# Patient Record
Sex: MALE | Race: WHITE | NOT HISPANIC OR LATINO | Employment: FULL TIME | URBAN - METROPOLITAN AREA
[De-identification: names, ages, dates, MRNs, and addresses within clinical notes are randomized per-mention and may not be internally consistent; named-entity substitution may affect disease eponyms.]

---

## 2020-10-05 ENCOUNTER — OFFICE VISIT (OUTPATIENT)
Dept: URGENT CARE | Facility: CLINIC | Age: 55
End: 2020-10-05
Payer: COMMERCIAL

## 2020-10-05 VITALS
SYSTOLIC BLOOD PRESSURE: 133 MMHG | HEART RATE: 84 BPM | OXYGEN SATURATION: 98 % | TEMPERATURE: 98 F | DIASTOLIC BLOOD PRESSURE: 85 MMHG

## 2020-10-05 DIAGNOSIS — J02.9 SORE THROAT: Primary | ICD-10-CM

## 2020-10-05 DIAGNOSIS — Z11.59 SCREENING FOR VIRAL DISEASE: ICD-10-CM

## 2020-10-05 LAB
CTP QC/QA: YES
SARS-COV-2 RDRP RESP QL NAA+PROBE: NEGATIVE

## 2020-10-05 PROCEDURE — U0002: ICD-10-PCS | Mod: QW,S$GLB,, | Performed by: NURSE PRACTITIONER

## 2020-10-05 PROCEDURE — U0002 COVID-19 LAB TEST NON-CDC: HCPCS | Mod: QW,S$GLB,, | Performed by: NURSE PRACTITIONER

## 2020-10-05 PROCEDURE — 99213 OFFICE O/P EST LOW 20 MIN: CPT | Mod: S$GLB,,, | Performed by: NURSE PRACTITIONER

## 2020-10-05 PROCEDURE — 99213 PR OFFICE/OUTPT VISIT, EST, LEVL III, 20-29 MIN: ICD-10-PCS | Mod: S$GLB,,, | Performed by: NURSE PRACTITIONER

## 2020-10-05 NOTE — PATIENT INSTRUCTIONS
Return to Urgent Care or go to ER if symptoms worsen or fail to improve.  Follow up with PCP as recommended for further management.     THE FOLLOWING ARE RECOMMENDED TO HELP YOU MANAGE YOUR SYMPTOMS:    Take Advil allergy and sinus (behind pharmacy counter) every 4-6 hours as needed for congestion, cough and body aches.  This medication contains ibuprofen, an antihistamine--chlorpheniramine , and sudafed, a decongestant.     Take dextromethorphan according to label instructions for cough.     Take Ibuprofen or Acetaminophen according to label instructions for fever, throat pain, headache, and body aches    Use warm salt water gargles to ease your throat pain. Warm salt water gargles as needed for sore throat-  1/2 tsp salt to 1 cup warm water, gargle as desired.        When You Have a Sore Throat    A sore throat can be painful. There are many reasons why you may have a sore throat. Your healthcare provider will work with you to find the cause of your sore throat. He or she will also find the best treatment for you.  What causes a sore throat?  Sore throats can be caused or worsened by:  · Cold or flu viruses  · Bacteria  · Irritants such as tobacco smoke or air pollution  · Acid reflux  A healthy throat  The tonsils are on the sides of the throat near the base of the tongue. They collect viruses and bacteria and help fight infection. The throat (pharynx) is the passage for air. Mucus from the nasal cavity also moves down the passage.  An inflamed throat  The tonsils and pharynx can become inflamed due to a cold or flu virus. Postnasal drip (excess mucus draining from the nasal cavity) can irritate the throat. It can also make the throat or tonsils more likely to be infected by bacteria. Severe, untreated tonsillitis in children or adults can cause a pocket of pus (abscess) to form near the tonsil.  Your evaluation  A medical evaluation can help find the cause of your sore throat. It can also help your healthcare  provider choose the best treatment for you. The evaluation may include a health history, physical exam, and diagnostic tests.  Health history  Your healthcare provider may ask you the following:  · How long has the sore throat lasted and how have you been treating it?  · Do you have any other symptoms, such as body aches, fever, or cough?  · Does your sore throat recur? If so, how often? How many days of school or work have you missed because of a sore throat?  · Do you have trouble eating or swallowing?  · Have you been told that you snore or have other sleep problems?  · Do you have bad breath?  · Do you cough up bad-tasting mucus?  Physical exam  During the exam, your healthcare provider checks your ears, nose, and throat for problems. He or she also checks for swelling in the neck, and may listen to your chest.  Possible tests  Other tests your healthcare provider may perform include:  · A throat swab to check for bacteria such as streptococcus (the bacteria that causes strep throat)  · A blood test to check for mononucleosis (a viral infection)  · A chest X-ray to rule out pneumonia, especially if you have a cough  Treating a sore throat  Treatment depends on many factors. What is the likely cause? Is the problem recent? Does it keep coming back? In many cases, the best thing to do is to treat the symptoms, rest, and let the problem heal itself. Antibiotics may help clear up some bacterial infections. For cases of severe or recurring tonsillitis, the tonsils may need to be removed.  Relieving your symptoms  · Dont smoke, and avoid secondhand smoke.  · For children, try throat sprays or Popsicles. Adults and older children may try lozenges.  · Drink warm liquids to soothe the throat and help thin mucus. Avoid alcohol, spicy foods, and acidic drinks such as orange juice. These can irritate the throat.  · Gargle with warm saltwater (1 teaspoon of salt to 8 ounces of warm water).  · Use a humidifier to keep air  "moist and relieve throat dryness.  · Try over-the-counter pain relievers such as acetaminophen or ibuprofen. Use as directed, and dont exceed the recommended dose. Dont give aspirin to children.   Are antibiotics needed?  If your sore throat is due to a bacterial infection, antibiotics may speed healing and prevent complications. Although group A streptococcus ("strep throat" or GAS) is the major treatable infection for a sore throat, GAS causes only 5% to 15% of sore throats in adults who seek medical care. Most sore throats are caused by cold or flu viruses. And antibiotics dont treat viral illness. In fact, using antibiotics when theyre not needed may produce bacteria that are harder to kill. Your healthcare provider will prescribe antibiotics only if he or she thinks they are likely to help.  If antibiotics are prescribed  Take the medicine exactly as directed. Be sure to finish your prescription even if youre feeling better. And be sure to ask your healthcare provider or pharmacist what side effects are common and what to do about them.  Is surgery needed?  In some cases, tonsils need to be removed. This is often done as outpatient (same-day) surgery. Your healthcare provider may advise removing the tonsils in cases of:  · Several severe bouts of tonsillitis in a year. Severe episodes include those that lead to missed days of school or work, or that need to be treated with antibiotics.  · Tonsillitis that causes breathing problems during sleep  · Tonsillitis caused by food particles collecting in pouches in the tonsils (cryptic tonsillitis)  Call your healthcare provider if any of the following occur:  · Symptoms worsen, or new symptoms develop.  · Swollen tonsils make breathing difficult.  · The pain is severe enough to keep you from drinking liquids.  · A skin rash, hives, or wheezing develops. Any of these could signal an allergic reaction to antibiotics.  · Symptoms dont improve within a " week.  · Symptoms dont improve within 2 to 3 days of starting antibiotics.   Date Last Reviewed: 10/1/2016  © 0929-6477 [employer] [address] [place] [address], [place] [address]. All rights reserved. This information is not intended as a substitute for professional medical care. Always follow your healthcare professional's instructions.

## 2020-10-05 NOTE — PROGRESS NOTES
Subjective:       Patient ID: Aniceto Whitfield is a 55 y.o. male.    Vitals:  temperature is 98.3 °F (36.8 °C). His blood pressure is 133/85 and his pulse is 84. His oxygen saturation is 98%.     Chief Complaint: COVID-19 Concerns    Sore Throat   This is a new problem. The current episode started in the past 7 days. The problem has been unchanged. Sore throat worse side: both. There has been no fever. Fever duration: none. The pain is at a severity of 3/10. The pain is mild. Pertinent negatives include no congestion, coughing, ear pain, shortness of breath, stridor or vomiting. He has tried nothing for the symptoms. Improvement on treatment: none taken.       Constitution: Negative for chills, sweating, fatigue and fever.   HENT: Positive for sore throat. Negative for ear pain, congestion, sinus pain, sinus pressure and voice change.    Neck: Negative for painful lymph nodes.   Eyes: Negative for eye redness.   Respiratory: Negative for chest tightness, cough, sputum production, bloody sputum, COPD, shortness of breath, stridor, wheezing and asthma.    Gastrointestinal: Negative for nausea and vomiting.   Musculoskeletal: Negative for muscle ache.   Skin: Negative for rash.   Allergic/Immunologic: Negative for seasonal allergies and asthma.   Hematologic/Lymphatic: Negative for swollen lymph nodes.       Objective:      Physical Exam   Constitutional: He is oriented to person, place, and time. He appears well-developed. He is cooperative.  Non-toxic appearance. He does not appear ill. No distress.   HENT:   Head: Normocephalic and atraumatic.   Ears:   Right Ear: Hearing and external ear normal.   Left Ear: Hearing and external ear normal.   Nose: Nose normal. No mucosal edema, rhinorrhea or nasal deformity. No epistaxis. Right sinus exhibits no maxillary sinus tenderness and no frontal sinus tenderness. Left sinus exhibits no maxillary sinus tenderness and no frontal sinus tenderness.   Mouth/Throat: Uvula is midline,  oropharynx is clear and moist and mucous membranes are normal. No trismus in the jaw. Normal dentition. No uvula swelling. No oropharyngeal exudate, posterior oropharyngeal edema or posterior oropharyngeal erythema.   Eyes: Conjunctivae and lids are normal. No scleral icterus.   Neck: Trachea normal, full passive range of motion without pain and phonation normal. Neck supple. No neck rigidity. No edema and no erythema present.   Cardiovascular: Normal rate and normal pulses.   Pulmonary/Chest: Effort normal. No respiratory distress. He has no decreased breath sounds. He has no rhonchi.   Abdominal: Normal appearance.   Musculoskeletal: Normal range of motion.         General: No deformity.   Neurological: He is alert and oriented to person, place, and time. He exhibits normal muscle tone. Coordination normal.   Skin: Skin is warm, dry, intact, not diaphoretic and not pale. Psychiatric: His speech is normal and behavior is normal. Judgment and thought content normal.   Nursing note and vitals reviewed.    Results for orders placed or performed in visit on 10/05/20   POCT COVID-19 Rapid Screening   Result Value Ref Range    POC Rapid COVID Negative Negative     Acceptable Yes            Assessment:       1. Sore throat    2. Screening for viral disease        Plan:         Sore throat    Screening for viral disease  -     POCT COVID-19 Rapid Screening

## 2020-10-17 ENCOUNTER — OFFICE VISIT (OUTPATIENT)
Dept: URGENT CARE | Facility: CLINIC | Age: 55
End: 2020-10-17
Payer: COMMERCIAL

## 2020-10-17 VITALS
OXYGEN SATURATION: 97 % | WEIGHT: 210 LBS | HEART RATE: 74 BPM | TEMPERATURE: 98 F | HEIGHT: 69 IN | DIASTOLIC BLOOD PRESSURE: 87 MMHG | BODY MASS INDEX: 31.1 KG/M2 | RESPIRATION RATE: 17 BRPM | SYSTOLIC BLOOD PRESSURE: 135 MMHG

## 2020-10-17 DIAGNOSIS — R05.9 COUGH: ICD-10-CM

## 2020-10-17 DIAGNOSIS — U07.1 COVID-19 VIRUS INFECTION: Primary | ICD-10-CM

## 2020-10-17 DIAGNOSIS — U07.1 COVID-19 VIRUS DETECTED: ICD-10-CM

## 2020-10-17 LAB
CTP QC/QA: YES
SARS-COV-2 RDRP RESP QL NAA+PROBE: POSITIVE

## 2020-10-17 PROCEDURE — U0002 COVID-19 LAB TEST NON-CDC: HCPCS | Mod: QW,S$GLB,, | Performed by: EMERGENCY MEDICINE

## 2020-10-17 PROCEDURE — 99214 PR OFFICE/OUTPT VISIT, EST, LEVL IV, 30-39 MIN: ICD-10-PCS | Mod: S$GLB,,, | Performed by: EMERGENCY MEDICINE

## 2020-10-17 PROCEDURE — U0002: ICD-10-PCS | Mod: QW,S$GLB,, | Performed by: EMERGENCY MEDICINE

## 2020-10-17 PROCEDURE — 99214 OFFICE O/P EST MOD 30 MIN: CPT | Mod: S$GLB,,, | Performed by: EMERGENCY MEDICINE

## 2020-10-17 RX ORDER — PANTOPRAZOLE SODIUM 40 MG/1
TABLET, DELAYED RELEASE ORAL
COMMUNITY
Start: 2020-09-23

## 2020-10-17 NOTE — PATIENT INSTRUCTIONS
Go to the Emergency Room if symptoms or condition worsens in any way    Zyrtec, Claritin, or Allegra OTC as directed for the next 7 days    Tylenol 500mg 2 tabs by mouth every 6 hours    Mucinex or Robitussin OTC as directed for cough    Sudafed as directed for runny nose and congestion          Instructions for Patients with Confirmed or Suspected COVID-19    If you are awaiting your test result, you will either be called or it will be released to the patient portal.  If you have any questions about your test, please visit [email or web address] or call our COVID-19 information line at [phone].       Stay home and stay away from family members and friends. The CDC says, you can leave home after these three things have happened: 1) You have had no fever for at least 24 hours (that is 1 full day of no fever without the use of medicine that reduces fevers) 2) AND other symptoms have improved (for example, when your cough or shortness of breath have improved) 3) AND at least 10 days have passed since your symptoms first appeared.   Separate yourself from other people and animals in your home.   Call ahead before visiting your doctor.   Wear a facemask.   Cover your coughs and sneezes.   Wash your hands often with soap and water; hand  can be used, too.   Avoid sharing personal household items.   Wipe down surfaces used daily.   Monitor your symptoms. Seek prompt medical attention if your illness is worsening (e.g., difficulty breathing).    Before seeking care, call your healthcare provider.   If you have a medical emergency and need to call 911, notify the dispatch personnel that you have, or are being evaluated for COVID-19. If possible, put on a facemask before emergency medical services arrive.        Recommended precautions for household members, intimate partners, and caregivers in a home setting of a patient with symptomatic laboratory-confirmed COVID-19 or a patient under  investigation.  Household members, intimate partners, and caregivers in the home setting awaiting tests results have close contact with a person with symptomatic, laboratory-confirmed COVID-19 or a person under investigation. Close contacts should monitor their health; they should call their provider right away if they develop symptoms suggestive of COVID-19 (e.g., fever, cough, shortness of breath).    Close contacts should also follow these recommendations:   Make sure that you understand and can help the patient follow their provider's instructions for medication(s) and care. You should help the patient with basic needs in the home and provide support for getting groceries, prescriptions, and other personal needs.   Monitor the patient's symptoms. If the patient is getting sicker, call his or her healthcare provider and tell them that the patient has laboratory-confirmed COVID-19. If the patient has a medical emergency and you need to call 911, notify the dispatch personnel that the patient has, or is being evaluated for COVID-19.   Household members should stay in another room or be  from the patient. Household members should use a separate bedroom and bathroom, if available.   Prohibit visitors.   Household members should care for any pets in the home.   Make sure that shared spaces in the home have good air flow, such as by an air conditioner or an opened window, weather permitting.   Perform hand hygiene frequently. Wash your hands often with soap and water for at least 20 seconds or use an alcohol-based hand  (that contains > 60% alcohol) covering all surfaces of your hands and rubbing them together until they feel dry. Soap and water should be used preferentially.   Avoid touching your eyes, nose, and mouth.   The patient should wear a facemask. If the patient is not able to wear a facemask (for example, because it causes trouble breathing), caregivers should wear a mask when they  are in the same room as the patient.   Wear a disposable facemask and gloves when you touch or have contact with the patient's blood, stool, or body fluids, such as saliva, sputum, nasal mucus, vomit, urine.  o Throw out disposable facemasks and gloves after using them. Do not reuse.  o When removing personal protective equipment, first remove and dispose of gloves. Then, immediately clean your hands with soap and water or alcohol-based hand . Next, remove and dispose of facemask, and immediately clean your hands again with soap and water or alcohol-based hand .   You should not share dishes, drinking glasses, cups, eating utensils, towels, bedding, or other items with the patient. After the patient uses these items, you should wash them thoroughly (see below Wash laundry thoroughly).   Clean all high-touch surfaces, such as counters, tabletops, doorknobs, bathroom fixtures, toilets, phones, keyboards, tablets, and bedside tables, every day. Also, clean any surfaces that may have blood, stool, or body fluids on them.   Use a household cleaning spray or wipe, according to the label instructions. Labels contain instructions for safe and effective use of the cleaning product including precautions you should take when applying the product, such as wearing gloves and making sure you have good ventilation during use of the product.   Wash laundry thoroughly.  o Immediately remove and wash clothes or bedding that have blood, stool, or body fluids on them.  o Wear disposable gloves while handling soiled items and keep soiled items away from your body. Clean your hands (with soap and water or an alcohol-based hand ) immediately after removing your gloves.  o Read and follow directions on labels of laundry or clothing items and detergent. In general, using a normal laundry detergent according to washing machine instructions and dry thoroughly using the warmest temperatures recommended on  the clothing label.   Place all used disposable gloves, facemasks, and other contaminated items in a lined container before disposing of them with other household waste. Clean your hands (with soap and water or an alcohol-based hand ) immediately after handling these items. Soap and water should be used preferentially if hands are visibly dirty.   Discuss any additional questions with your state or local health department or healthcare provider. Check available hours when contacting your local health department.    For more information see CDC link below.      https://www.cdc.gov/coronavirus/2019-ncov/hcp/guidance-prevent-spread.html#precautions        Sources:  CDC, [facility] and [facility]

## 2020-10-17 NOTE — LETTER
October 17, 2020    Aniceto Whitfield  [address] [facility] [address]             [facility] [address] [place] [facility] [address]  Phone: [phone]  Fax: [phone] Return to Work/School Status          [facility] has adopted CDCs time-based return to work/school strategy for persons with confirmed or suspected COVID19. [facility] does not recommend using a test-based strategy for returning to work/school after COVID-19 infection. [facility] has reported prolonged positive test results without evidence of infectiousness. At this time, positive specimens capable of producing disease have not been isolated more than 9 days after onset of illness.   Symptomatic persons with confirmed COVID-19 or suspected COVID-19 can return to work/school after:    At least 1 days (24hours) have passed since recovery defined as resolution of fever without the use of fever-reducing medications AND improvement in respiratory symptoms (e.g., cough, shortness of breath)    AND, at least 10 days have passed since first positive test  Asymptomatic persons with confirmed COVID-19 can return to work after:   At least 10 days have passed since the positive laboratory test and the person remains asymptomatic.  More information about the science behind the symptom-based return to work/school can be found at: https://www.cdc.gov/coronavirus/2019-ncov/community/jgnonvwq-toiintnwuos-etinqkygk.html    Sincerely,      Frankie Reece MD

## 2020-10-17 NOTE — PROGRESS NOTES
"Subjective:       Patient ID: Aniceto Whitfield is a 55 y.o. male.    Vitals:  height is 5' 9" (1.753 m) and weight is 95.3 kg (210 lb). His temperature is 97.7 °F (36.5 °C). His blood pressure is 135/87 and his pulse is 74. His respiration is 17 and oxygen saturation is 97%.     Chief Complaint: Cough    Pt states cough x 2-3 days.  Pt states family members tested positive for covid this week.    Cough  This is a new problem. The current episode started in the past 7 days. The problem has been gradually worsening. The cough is non-productive. Associated symptoms include headaches. Pertinent negatives include no chest pain, chills, fever, myalgias, rash, sore throat or shortness of breath. Nothing aggravates the symptoms. He has tried nothing for the symptoms.       Constitution: Negative for chills, fatigue and fever.   HENT: Negative for congestion and sore throat.    Neck: Negative for painful lymph nodes.   Cardiovascular: Negative for chest pain and leg swelling.   Eyes: Negative for double vision and blurred vision.   Respiratory: Positive for cough. Negative for shortness of breath.    Gastrointestinal: Negative for nausea, vomiting and diarrhea.   Genitourinary: Negative for dysuria, frequency and urgency.   Musculoskeletal: Negative for joint pain, joint swelling, muscle cramps and muscle ache.   Skin: Negative for color change, pale, rash and erythema.   Allergic/Immunologic: Negative for seasonal allergies.   Neurological: Positive for headaches. Negative for dizziness, history of vertigo, light-headedness and passing out.   Hematologic/Lymphatic: Negative for swollen lymph nodes, easy bruising/bleeding and history of blood clots. Does not bruise/bleed easily.   Psychiatric/Behavioral: Negative for nervous/anxious, sleep disturbance and depression. The patient is not nervous/anxious.        Objective:      Physical Exam   Constitutional: He is oriented to person, place, and time. He appears well-developed. "   HENT:   Head: Normocephalic and atraumatic. Head is without abrasion, without contusion and without laceration.   Ears:   Right Ear: External ear normal.   Left Ear: External ear normal.   Oropharyngeal exam not performed due to risk of viral transmission during global pandemic-- risks outweigh benefits of exam        Comments: Oropharyngeal exam not performed due to risk of viral transmission during global pandemic-- risks outweigh benefits of exam    Eyes: Pupils are equal, round, and reactive to light. Conjunctivae, EOM and lids are normal.   Neck: Trachea normal, full passive range of motion without pain and phonation normal. Neck supple.   Cardiovascular: Normal rate, regular rhythm and normal heart sounds.   Pulmonary/Chest: Effort normal and breath sounds normal. No stridor. No respiratory distress.   Musculoskeletal: Normal range of motion.   Neurological: He is alert and oriented to person, place, and time.   Skin: Skin is warm, dry, intact and no rash. Capillary refill takes less than 2 seconds. abrasion, burn, bruising, erythema and ecchymosisPsychiatric: His speech is normal and behavior is normal. Judgment and thought content normal.   Nursing note and vitals reviewed.        Assessment:       1. COVID-19 virus infection    2. Cough        Plan:         COVID-19 virus infection    Cough  -     POCT COVID-19 Rapid Screening          Patient Instructions   Go to the Emergency Room if symptoms or condition worsens in any way    Zyrtec, Claritin, or Allegra OTC as directed for the next 7 days    Tylenol 500mg 2 tabs by mouth every 6 hours    Mucinex or Robitussin OTC as directed for cough    Sudafed as directed for runny nose and congestion          Instructions for Patients with Confirmed or Suspected COVID-19    If you are awaiting your test result, you will either be called or it will be released to the patient portal.  If you have any questions about your test, please visit [email or web address]  or call our COVID-19 information line at [phone].       Stay home and stay away from family members and friends. The CDC says, you can leave home after these three things have happened: 1) You have had no fever for at least 24 hours (that is 1 full day of no fever without the use of medicine that reduces fevers) 2) AND other symptoms have improved (for example, when your cough or shortness of breath have improved) 3) AND at least 10 days have passed since your symptoms first appeared.   Separate yourself from other people and animals in your home.   Call ahead before visiting your doctor.   Wear a facemask.   Cover your coughs and sneezes.   Wash your hands often with soap and water; hand  can be used, too.   Avoid sharing personal household items.   Wipe down surfaces used daily.   Monitor your symptoms. Seek prompt medical attention if your illness is worsening (e.g., difficulty breathing).    Before seeking care, call your healthcare provider.   If you have a medical emergency and need to call 911, notify the dispatch personnel that you have, or are being evaluated for COVID-19. If possible, put on a facemask before emergency medical services arrive.        Recommended precautions for household members, intimate partners, and caregivers in a home setting of a patient with symptomatic laboratory-confirmed COVID-19 or a patient under investigation.  Household members, intimate partners, and caregivers in the home setting awaiting tests results have close contact with a person with symptomatic, laboratory-confirmed COVID-19 or a person under investigation. Close contacts should monitor their health; they should call their provider right away if they develop symptoms suggestive of COVID-19 (e.g., fever, cough, shortness of breath).    Close contacts should also follow these recommendations:   Make sure that you understand and can help the patient follow their provider's instructions for  medication(s) and care. You should help the patient with basic needs in the home and provide support for getting groceries, prescriptions, and other personal needs.   Monitor the patient's symptoms. If the patient is getting sicker, call his or her healthcare provider and tell them that the patient has laboratory-confirmed COVID-19. If the patient has a medical emergency and you need to call 911, notify the dispatch personnel that the patient has, or is being evaluated for COVID-19.   Household members should stay in another room or be  from the patient. Household members should use a separate bedroom and bathroom, if available.   Prohibit visitors.   Household members should care for any pets in the home.   Make sure that shared spaces in the home have good air flow, such as by an air conditioner or an opened window, weather permitting.   Perform hand hygiene frequently. Wash your hands often with soap and water for at least 20 seconds or use an alcohol-based hand  (that contains > 60% alcohol) covering all surfaces of your hands and rubbing them together until they feel dry. Soap and water should be used preferentially.   Avoid touching your eyes, nose, and mouth.   The patient should wear a facemask. If the patient is not able to wear a facemask (for example, because it causes trouble breathing), caregivers should wear a mask when they are in the same room as the patient.   Wear a disposable facemask and gloves when you touch or have contact with the patient's blood, stool, or body fluids, such as saliva, sputum, nasal mucus, vomit, urine.  o Throw out disposable facemasks and gloves after using them. Do not reuse.  o When removing personal protective equipment, first remove and dispose of gloves. Then, immediately clean your hands with soap and water or alcohol-based hand . Next, remove and dispose of facemask, and immediately clean your hands again with soap and water or  alcohol-based hand .   You should not share dishes, drinking glasses, cups, eating utensils, towels, bedding, or other items with the patient. After the patient uses these items, you should wash them thoroughly (see below Wash laundry thoroughly).   Clean all high-touch surfaces, such as counters, tabletops, doorknobs, bathroom fixtures, toilets, phones, keyboards, tablets, and bedside tables, every day. Also, clean any surfaces that may have blood, stool, or body fluids on them.   Use a household cleaning spray or wipe, according to the label instructions. Labels contain instructions for safe and effective use of the cleaning product including precautions you should take when applying the product, such as wearing gloves and making sure you have good ventilation during use of the product.   Wash laundry thoroughly.  o Immediately remove and wash clothes or bedding that have blood, stool, or body fluids on them.  o Wear disposable gloves while handling soiled items and keep soiled items away from your body. Clean your hands (with soap and water or an alcohol-based hand ) immediately after removing your gloves.  o Read and follow directions on labels of laundry or clothing items and detergent. In general, using a normal laundry detergent according to washing machine instructions and dry thoroughly using the warmest temperatures recommended on the clothing label.   Place all used disposable gloves, facemasks, and other contaminated items in a lined container before disposing of them with other household waste. Clean your hands (with soap and water or an alcohol-based hand ) immediately after handling these items. Soap and water should be used preferentially if hands are visibly dirty.   Discuss any additional questions with your state or local health department or healthcare provider. Check available hours when contacting your local health department.    For more information see CDC  link below.      https://www.cdc.gov/coronavirus/2019-ncov/hcp/guidance-prevent-spread.html#precautions        Sources:  [facility] [place], [place] Department of Health and Hospitals

## 2021-06-11 ENCOUNTER — CLINICAL SUPPORT (OUTPATIENT)
Dept: URGENT CARE | Facility: CLINIC | Age: 56
End: 2021-06-11
Payer: COMMERCIAL

## 2021-06-11 DIAGNOSIS — Z13.9 ENCOUNTER FOR SCREENING: Primary | ICD-10-CM

## 2021-06-11 LAB
CTP QC/QA: YES
SARS-COV-2 RDRP RESP QL NAA+PROBE: NEGATIVE

## 2021-06-11 PROCEDURE — U0002 COVID-19 LAB TEST NON-CDC: HCPCS | Mod: QW,S$GLB,, | Performed by: NURSE PRACTITIONER

## 2021-06-11 PROCEDURE — U0002: ICD-10-PCS | Mod: QW,S$GLB,, | Performed by: NURSE PRACTITIONER

## 2021-11-24 ENCOUNTER — CLINICAL SUPPORT (OUTPATIENT)
Dept: URGENT CARE | Facility: CLINIC | Age: 56
End: 2021-11-24
Payer: COMMERCIAL

## 2021-11-24 DIAGNOSIS — Z11.52 ENCOUNTER FOR SCREENING FOR COVID-19: Primary | ICD-10-CM

## 2021-11-24 LAB
CTP QC/QA: YES
SARS-COV-2 RDRP RESP QL NAA+PROBE: NEGATIVE

## 2021-11-24 PROCEDURE — U0002: ICD-10-PCS | Mod: QW,S$GLB,, | Performed by: FAMILY MEDICINE

## 2021-11-24 PROCEDURE — U0002 COVID-19 LAB TEST NON-CDC: HCPCS | Mod: QW,S$GLB,, | Performed by: FAMILY MEDICINE

## 2021-12-18 ENCOUNTER — OFFICE VISIT (OUTPATIENT)
Dept: URGENT CARE | Facility: CLINIC | Age: 56
End: 2021-12-18
Payer: COMMERCIAL

## 2021-12-18 VITALS
HEIGHT: 69 IN | BODY MASS INDEX: 31.1 KG/M2 | TEMPERATURE: 97 F | HEART RATE: 78 BPM | DIASTOLIC BLOOD PRESSURE: 96 MMHG | SYSTOLIC BLOOD PRESSURE: 143 MMHG | RESPIRATION RATE: 18 BRPM | WEIGHT: 210 LBS | OXYGEN SATURATION: 98 %

## 2021-12-18 DIAGNOSIS — J02.9 SORE THROAT: ICD-10-CM

## 2021-12-18 DIAGNOSIS — J02.9 ACUTE VIRAL PHARYNGITIS: Primary | ICD-10-CM

## 2021-12-18 LAB
CTP QC/QA: YES
CTP QC/QA: YES
MOLECULAR STREP A: NEGATIVE
SARS-COV-2 RDRP RESP QL NAA+PROBE: NEGATIVE

## 2021-12-18 PROCEDURE — 87651 STREP A DNA AMP PROBE: CPT | Mod: QW,S$GLB,, | Performed by: NURSE PRACTITIONER

## 2021-12-18 PROCEDURE — 99213 OFFICE O/P EST LOW 20 MIN: CPT | Mod: S$GLB,,, | Performed by: NURSE PRACTITIONER

## 2021-12-18 PROCEDURE — U0002 COVID-19 LAB TEST NON-CDC: HCPCS | Mod: QW,S$GLB,, | Performed by: NURSE PRACTITIONER

## 2021-12-18 PROCEDURE — 99213 PR OFFICE/OUTPT VISIT, EST, LEVL III, 20-29 MIN: ICD-10-PCS | Mod: S$GLB,,, | Performed by: NURSE PRACTITIONER

## 2021-12-18 PROCEDURE — 87651 POCT STREP A MOLECULAR: ICD-10-PCS | Mod: QW,S$GLB,, | Performed by: NURSE PRACTITIONER

## 2021-12-18 PROCEDURE — U0002: ICD-10-PCS | Mod: QW,S$GLB,, | Performed by: NURSE PRACTITIONER

## 2021-12-18 RX ORDER — CODEINE PHOSPHATE AND GUAIFENESIN 10; 100 MG/5ML; MG/5ML
5 SOLUTION ORAL 3 TIMES DAILY PRN
COMMUNITY
Start: 2021-12-14 | End: 2021-12-24

## 2021-12-18 RX ORDER — AMLODIPINE BESYLATE 2.5 MG/1
2.5 TABLET ORAL
COMMUNITY
Start: 2021-10-05 | End: 2022-10-05

## 2022-05-03 ENCOUNTER — PATIENT MESSAGE (OUTPATIENT)
Dept: RESEARCH | Facility: HOSPITAL | Age: 57
End: 2022-05-03
Payer: COMMERCIAL

## 2022-05-26 ENCOUNTER — CLINICAL SUPPORT (OUTPATIENT)
Dept: SPEECH THERAPY | Facility: HOSPITAL | Age: 57
End: 2022-05-26
Payer: COMMERCIAL

## 2022-05-26 DIAGNOSIS — C32.0 MALIGNANT NEOPLASM OF GLOTTIS: ICD-10-CM

## 2022-05-26 DIAGNOSIS — R49.0 DYSPHONIA: Primary | ICD-10-CM

## 2022-05-26 DIAGNOSIS — Z92.3 HISTORY OF RADIATION THERAPY: ICD-10-CM

## 2022-05-26 DIAGNOSIS — J38.4 EDEMA OF LARYNX: ICD-10-CM

## 2022-05-26 PROCEDURE — 92524 BEHAVRAL QUALIT ANALYS VOICE: CPT | Mod: GN

## 2022-05-26 NOTE — PROGRESS NOTES
Referring provider: Dr. Travis Morales  Reason for visit:  Behavioral and qualitative analysis of voice and resonance (CPT 08746)        Subjective / History    Aniceto Whitfield is a 57 y.o. male referred for voice evaluation (CPT 51592) by Dr. Morales. 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 Kelley August 3, 2021 with [id number]. He reports that he was able to complete therapy without any breaks in treatment. His voice became very hoarse during treatment, he states around a 2/10. Today he states voice is mostly an 8/10. He had some swallow difficulty during treatment, this has essentially resolve. His voice has improved considerably he rates it now as a 6 out of 10 where it was at 2 pretreatment. He is employed as an , does go to trial and uses voice extensively on the phone. He describes voice as raspy, worsens after talking in background noise such as restaurants and events. He is being more cautious about voice, does not yell at much at sporting events. He tries to drink more water, refrain from funny voices (like when talking with dogs). He was recently seen by Dr Morales about 3 weeks ago and scoped. Medication and problem lists were reviewed.      Swallow: difficulty mostly denied, other than dry mouth  Breathing: cough and throat clear at times, senses mucus and tickle  Smoking: non smoker   Reflux: once monthly  Water: >20 oz/day     Laryngoscopy findings per Dr. Morales  9/23/21  Assessment   Impression & Recommendations: Patient with history of T1 a N0 M0 squamous cell carcinoma of the left true vocal fold status post completion of radiation therapy 6 weeks ago with no evidence of disease doing very well. Went through his video examination from today demonstrating the healing larynx and no evidence of disease in comparison to his previous exam. He is very pleased with  his voice improvement. We discussed ongoing voice use and avoiding voice overuse. He has a scheduled follow-up PET/CT with radiation oncology. He will follow-up with us in 2 months time for reevaluation or sooner if he is having any difficulty      Past Medical History:   Diagnosis Date    GERD (gastroesophageal reflux disease)      Current Outpatient Medications on File Prior to Visit   Medication Sig Dispense Refill    amLODIPine (NORVASC) 2.5 MG tablet Take 2.5 mg by mouth.      pantoprazole (PROTONIX) 40 MG tablet        No current facility-administered medications on file prior to visit.       Objective    Perceptual/behavioral assessment  -Quality: segura/pulse  -Volume: decreased projection  -Pitch: low F0  -Flexibility: diminished  -Habitual respiratory pattern: breath holding in part due to attempts at stifling cough      Voice Handicap Index-10 (completed to assess self-perceived handicap associated with dysphonia; >11 considered abnormal):   Many people have used the following statements to describe their voices and the effects of their voices on their lives. Please Alutiiq the response that indicates how frequently you have the same experience.   0 = Never 1 = Almost Never 2 = Sometimes 3 = Almost Always 4 = Always     My voice makes it difficult for people to hear me. 0 1 2 3 4   People have difficulty understanding me in a noisy room. 0 1 2 3 4   My voice difficulties restrict personal and social life. 0 1 2 3 4   I feel left out of conversations because of my voice. 0 1 2 3 4   My voice problem causes me to lose income. 0 1 2 3 4   I feel as though I have to strain to produce voice. 0 1 23  4   The clarity of my voice is unpredictable. 0 1 2 3 4   My voice problem upsets me. 0 1 2 3 4   My voice makes me feel handicapped. 0 1 2 3 4   People ask, Whats wrong with your voice? 0 1 2 3 4  Score: 23    The Reflux Symptom Index (RSI)   Within the last MONTH, how did the following problem affect you?    0  = No problem to  5 = Severe problem   1.  Hoarseness or a problem with your voice 0 1 2 3 4 5   2.  Clearing your throat 0 1 2 3 4 5   3.  Excess throat mucous or postnasal drip 0 1 2 3 4 5   4.  Difficulty swallowing food, liquids, or pills 0 1 2 3 4 5   5.  Coughing after you ate or after lying down 0 1 2 3 4 5   6.  Breathing difficulties or choking episodes 0 1 2 3 4 5   7.  Troublesome or annoying cough 0 1 2 3 4 5   8.  Sensations of something sticking in your throat or a lump in your throat 0 1 2 3 4 5   9.  Heartburn, chest pain, indigestion, or stomach acid coming up 0 1 2 3 4 5   Score: 15  If you have an RSI score of 15 or greater, you have a 90% chance of having reflux, even if you have never had heartburn or indigestion. This is called silent reflux, and it's very common.  Liliam PC, Mandy GN, and Skip JA.  Validity and reliability of the reflux symptom index (RSI).  Journal of Voice.   2002.  16(2): 274-277.       The Glottal Closure Index (GCI)   Speaking takes extra effort 0 1 2 3 4 5   Throat discomfort or pain after you use your voice 0 1 2 3 4 5   Vocal fatigue, voice weakness as you talk 0 1 2 3 4 5   Voice cracks, or sounds different 0 1 2 3 4 5   GCI=17      Clinical Evaluation/Treatment: This is a well developed, well nourished male who presents in no acute distress. The patient is fully oriented, affect is WNL. Oral mechanism examination reveals articulators to have range, speed and coordination of movement WFL for speech and swallow tasks. Voice today is mild to moderately dysphonic with low fo, segura, gravelly quality.     Education / Stimulability Trials  Working of larynx for voice, airway and swallow discussed at length using picture diagrams. These were particularly useful in explanation of findings from laryngoscopy noted on Dr Morales's 9/23/21 visit, with as they pertain to the patient's voice disorder. Discussed importance of vocal hygiene including: hydration, reducing  caffeine/drying agents and reducing throat clearing, coughing, other phonotraumatic behaviors. Recommended steam and dry mouth lozenges as pt is dry from radiation, resulting in his perception of mucus that he continually is trying to clear. Assisted in training patient on antecedent sensations/behaviors which trigger the cough, which may include a tickle in the throat.  In order to optimize laryngeal environment, discussed elimination of cough drops, caffeine, mouth breathing and food/drinks that exacerbate GERD/LPR, and encouraged hydration, swallowing, increasing ambient humidity and nasal breathing. Patient was stimulable for participation in more efficient breathing and cough avoidance strategies, or sipping water, performing effortful swallow and nasal inhalation to humidify air and abduct vocal folds. Educated patient that saliva is likely dry and thick, giving sensation of mucus. Additionally, he may be generating mucus from hard throat clearing he is constantly doing. Patient was stimulable for improved voice using SOVT exercises with straw phonation and cave. Improvements in forward resonance with clearer vocal quality noted on glides. Patient establishes optimal pitch at higher F0. He was able to demonstrate carryover into phrases. Following voice-therapy training, patient endorses improvements in vocal quality, flexibility and decreased extralaryngeal tension. Written instructions for Home Exercise program provided. Encouraged practicing exercises several times daily in isolation and into short phrases to solidify muscle memory patterns and reduce extralaryngeal tension during speech tasks.  She was amenable to all suggestions.       Short Term Goals: (4 weeks)   1. Patient will complete SOVT exercises and/or resonant-focused exercises 3-5x daily to improve vocal cord function including pliability and reduction in MTD.     Initiated   2. Patient will improve the quality, efficiency, and ease of phonation  through resonant and/or airflow exercises from the syllable to conversation level with 90% accuracy.     Initiated   3.  Patient will discriminate between easy and strained phonation with 90% accuracy.     Initiated   4. Pt will recall and utilize vocal hygiene strategies ind'ly.  Initiated   5. Pt will reduce/eliminate/substitute throat clearing ind'ly.     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  Prognosis for continued improvement is good, as he was able to demonstrate improved voice in today's session.     Recommendations / POC    -Recommend 6-8 sessions of voice therapy over 12 weeks with a speech-language pathologist to optimize glottal postures for improved vocal function, vocal efficiency, and ease of phonation  -Initiate laryngeal hygiene and voice exercises as trained and discussed in session  -f/u scheduled for 2 weeks  -Contact clinician with any further questions

## 2022-06-13 ENCOUNTER — TELEPHONE (OUTPATIENT)
Dept: SPEECH THERAPY | Facility: HOSPITAL | Age: 57
End: 2022-06-13
Payer: COMMERCIAL

## 2022-06-24 ENCOUNTER — TELEPHONE (OUTPATIENT)
Dept: SPEECH THERAPY | Facility: HOSPITAL | Age: 57
End: 2022-06-24
Payer: COMMERCIAL

## 2022-06-24 NOTE — TELEPHONE ENCOUNTER
Called dr. haq office [phone] to follow up on referral. Was informed doctor out today not returning until Tuesday, his nurse is supposed to call me back today.

## 2022-07-11 ENCOUNTER — TELEPHONE (OUTPATIENT)
Dept: SPEECH THERAPY | Facility: HOSPITAL | Age: 57
End: 2022-07-11
Payer: COMMERCIAL

## 2022-07-11 NOTE — TELEPHONE ENCOUNTER
Called dr. haq office [phone] to follow up on referral. Was informed doctor out today not returning until Tuesday, I will call back tomorrow in regards to referral for PORFIRIO.

## 2022-07-13 ENCOUNTER — TELEPHONE (OUTPATIENT)
Dept: SPEECH THERAPY | Facility: HOSPITAL | Age: 57
End: 2022-07-13
Payer: COMMERCIAL

## 2022-07-14 ENCOUNTER — TELEPHONE (OUTPATIENT)
Dept: SPEECH THERAPY | Facility: HOSPITAL | Age: 57
End: 2022-07-14
Payer: COMMERCIAL

## 2022-07-14 NOTE — TELEPHONE ENCOUNTER
Sw provider office this morning he has just returned from vacation, they gave him original referral to authorize additional visits. I provided fax number to obtain.

## 2022-07-20 ENCOUNTER — TELEPHONE (OUTPATIENT)
Dept: SPEECH THERAPY | Facility: HOSPITAL | Age: 57
End: 2022-07-20
Payer: COMMERCIAL

## 2022-07-20 NOTE — TELEPHONE ENCOUNTER
Larissa Bernardo from provider office she sates they have not received a request for additional visits. She does ask that a new fax be provided to fax number [phone].

## 2022-07-21 ENCOUNTER — TELEPHONE (OUTPATIENT)
Dept: SPEECH THERAPY | Facility: HOSPITAL | Age: 57
End: 2022-07-21
Payer: COMMERCIAL

## 2022-07-25 ENCOUNTER — CLINICAL SUPPORT (OUTPATIENT)
Dept: SPEECH THERAPY | Facility: HOSPITAL | Age: 57
End: 2022-07-25
Payer: COMMERCIAL

## 2022-07-25 DIAGNOSIS — J38.4 EDEMA OF LARYNX: ICD-10-CM

## 2022-07-25 DIAGNOSIS — C32.0 MALIGNANT NEOPLASM OF GLOTTIS: ICD-10-CM

## 2022-07-25 DIAGNOSIS — R49.0 DYSPHONIA: Primary | ICD-10-CM

## 2022-07-25 DIAGNOSIS — Z92.3 HISTORY OF RADIATION THERAPY: ICD-10-CM

## 2022-07-25 PROCEDURE — 92507 TX SP LANG VOICE COMM INDIV: CPT

## 2022-07-25 NOTE — PROGRESS NOTES
"Referring provider: Dr. Travis Morales  Reason for visit:  Voice Treatment (70[address])  Session # 1    Subjective / History    I had the pleasure of seeing Mr. Whitfield for his first treatment session following full voice evaluation on 5/26/2022.  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 Kelley August 3, 2021 with [id number]. Today he reports voice sounds better; still a little raspy and fatigues with use: "I have good and bad days in terms of my voice." Currently rates voice 6/10. Never loses voice completely but people do ask him to repeat himself. Pt notices throat dryness after cardio-work outs. Denies dysgeusia, odynophagia, endorses continued throat clearing. Recently returned from vacation and has not had many occasion to practice HEP.   Pt is an : characterizes a heavy daily vocal load. He was recently seen by Dr Morales on 5/10/2022 and and scoped. Has f/u with Carmen scheduled for every 3 months. Medication and problem lists were reviewed.      Swallow: difficulty mostly denied, other than dry mouth  Breathing: continued throat clearing and an intermittent "cough followed by three sneezes."   Smoking: non smoker   Reflux: resolved with medication  Water: >20 oz/day     Laryngoscopy findings per Dr. Morales  9/23/21  Assessment   Impression & Recommendations: Patient with history of T1 a N0 M0 squamous cell carcinoma of the left true vocal fold status post completion of radiation therapy 6 weeks ago with no evidence of disease doing very well. Went through his video examination from today demonstrating the healing larynx and no evidence of disease in comparison to his previous exam. He is very pleased with his voice improvement. We discussed ongoing voice use and avoiding voice overuse.   He has a scheduled follow-up PET/CT with radiation oncology. He will follow-up with " us in 2 months time for reevaluation or sooner if he is having any difficulty    Past Medical History:   Diagnosis Date    GERD (gastroesophageal reflux disease)      Current Outpatient Medications on File Prior to Visit   Medication Sig Dispense Refill    amLODIPine (NORVASC) 2.5 MG tablet Take 2.5 mg by mouth.      pantoprazole (PROTONIX) 40 MG tablet        No current facility-administered medications on file prior to visit.     Objective    Perceptual/behavioral assessment  -Quality: raspy  -Volume: decreased projection  -Pitch: low F0  -Flexibility: diminished  -Habitual respiratory pattern: breath holding     Voice Handicap Index-10   Score: 23 (5/26/2022)    The Reflux Symptom Index (RSI)   Score: 15 (5/26/2022)    The Glottal Closure Index (GCI)   GCI=17    Clinical Evaluation: This is a well developed, well nourished male who presents in no acute distress. The patient is fully oriented, affect is WNL. Oral mechanism examination reveals articulators to have range, speed and coordination of movement WFL for speech and swallow tasks. Voice today is mild to moderately dysphonic with low fo, gravelly quality.     Education / Treatment:  Workings of larynx for voice discussed at length using picture diagrams. Discussed importance of vocal hygiene including: hydration, reducing caffeine/drying agents and reducing throat clearing, coughing, other phonotraumatic behaviors. Recommended steam, gargle and dry mouth lozenges as pt is dry from radiation, resulting in his perception of mucus that he continually is trying to clear. Reviewed and retrained cough suppression strategies and guidelines. Patient was stimulable for improved voice using SOVT exercises with straw phonation. Improvements in forward resonance with clearer vocal quality noted on glides. Following voice therapy HEP review pt establishes more stable vocal quality at higher F0 which was less raspy, mildly breathy. Written instructions for Home  Exercise program provided. Encouraged practicing exercises several times daily in isolation and into short phrases to solidify muscle memory patterns and reduce extralaryngeal tension during speech tasks.  She was amenable to all suggestions.       Short Term Goals: (4 weeks)   1. Patient will complete SOVT exercises and/or resonant-focused exercises 3-5x daily to improve vocal cord function including pliability and reduction in MTD.     Ongoing   2. Patient will improve the quality, efficiency, and ease of phonation through resonant and/or airflow exercises from the syllable to conversation level with 90% accuracy.     Ongoing    3.  Patient will discriminate between easy and strained phonation with 90% accuracy.     Ongoing    4. Pt will recall and utilize vocal hygiene strategies ind'ly.  Ongoing    5. Pt will reduce/eliminate/substitute throat clearing ind'ly.     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  Prognosis for continued improvement is good, as he was able to demonstrate improved voice in today's session.     Recommendations / POC    -Recommend 6-8 sessions of voice therapy over 12 weeks with a speech-language pathologist to optimize glottal postures for improved vocal function, vocal efficiency, and ease of phonation  -Initiate laryngeal hygiene and voice exercises as trained and discussed in session  -f/u scheduled for 2 weeks  -Contact clinician with any further questions

## 2022-08-25 ENCOUNTER — CLINICAL SUPPORT (OUTPATIENT)
Dept: SPEECH THERAPY | Facility: HOSPITAL | Age: 57
End: 2022-08-25
Payer: COMMERCIAL

## 2022-08-25 DIAGNOSIS — Z92.3 HISTORY OF RADIATION THERAPY: ICD-10-CM

## 2022-08-25 DIAGNOSIS — J38.4 EDEMA OF LARYNX: ICD-10-CM

## 2022-08-25 DIAGNOSIS — C32.0 MALIGNANT NEOPLASM OF GLOTTIS: ICD-10-CM

## 2022-08-25 DIAGNOSIS — R49.0 DYSPHONIA: Primary | ICD-10-CM

## 2022-08-25 NOTE — PATIENT INSTRUCTIONS
"VOCAL HEALTH GUIDE    Homeopathic Throat Soothers  Bautista's Gargle  Ingredients:   - Half tsp salt.  - Half tsp baking soda.  - Half tbsp. Maura syrup.  - 6 oz warm water  Mix  all ingredients and gargle in small amounts for two minutes. Do not eat or drink for 15 minutes after gargling. Use to relive dry/scratchy and/or sore throat. Developed by Dr.Van Bird, Otolaryngologist.    Throat Coat Tea. Traditional medicine. Active ingredient: slippery elm bark.  Lozenges: Xylimelts, Halls Breezers, Slippery Elm  Echinacea. lozenges or liquid, with or without goldenseal.  Reduce Reflux: Papaya products and alginate therapy: Gaviscon Advance or Reflux Gourmet     Hydration: It is essential to keep the entire body, including vocal folds, hydrated. The best way to do this is by drinking water. It is better to sip water throughout the day than to drink an entire glass of water all at once. Drinking large quantities of water may put unwanted pressure on your stomach and increase your chance of acid reflux.  Direct steam inhalation.  Vaporizer or cool mist humidifier. Humidify air in your living spaces during then winter months. This will, in turn, decrease the chance vocal fold tissue dehydration.  Portable Steam Inhalation     Voice Exercises  .Voice Home Exercise Program- SOVT exercise routine with /u/  Practice 3-5 times a day, as a warm up, mid-day reset and evening cool down-- in or around middle-C. Warm up the voice before singing.     1. Sustains: take a deep breath, posture the vowel (lax jaw, round lips) and sustain a comfortable pitch for as long as you can. Can you feel the sound buzzing at the lips?   2. Glides: take a deep breath, posture the vowel and glide from low to high and high to low. Keep the sound focused forward the whole time to eliminate pitch-breaks.   3. Use the /u/ sound at the onset of some short words and phrases like, "OOOone, two, three;" "OOOwhere;" "OOOwhy," "OOOwell." Do you hear " improvements in the vocal quality at a certain pitch? Does the voice sound louder? Does it feel different?    https://www.youtube.com/watch?v=hyGb5B-FV-1    Environmental and Behavioral Modification  Avoid yelling: find ways to get attention w/ out voice.  Use a personal amplification or [place] in court ([employer]).  Conserve voice: plan vocal naps for intentional vocal recovery.  Warm up, reset and cool down your voice every day.

## 2022-08-25 NOTE — PLAN OF CARE
"VOCAL HEALTH GUIDE    Homeopathic Throat Soothers  Bautista's Gargle  Ingredients:   - Half tsp salt.  - Half tsp baking soda.  - Half tbsp. Maura syrup.  - 6 oz warm water  Mix  all ingredients and gargle in small amounts for two minutes. Do not eat or drink for 15 minutes after gargling. Use to relive dry/scratchy and/or sore throat. Developed by Dr.Van Bird, Otolaryngologist.    Throat Coat Tea. Traditional medicine. Active ingredient: slippery elm bark.  Lozenges: Xylimelts, Halls Breezers, Slippery Elm  Echinacea. lozenges or liquid, with or without goldenseal.  Reduce Reflux: Papaya products and alginate therapy: Gaviscon Advance or Reflux Gourmet     Hydration: It is essential to keep the entire body, including vocal folds, hydrated. The best way to do this is by drinking water. It is better to sip water throughout the day than to drink an entire glass of water all at once. Drinking large quantities of water may put unwanted pressure on your stomach and increase your chance of acid reflux.  Direct steam inhalation.  Vaporizer or cool mist humidifier. Humidify air in your living spaces during then winter months. This will, in turn, decrease the chance vocal fold tissue dehydration.  Portable Steam Inhalation     Voice Exercises  .Voice Home Exercise Program- SOVT exercise routine with /u/  Practice 3-5 times a day, as a warm up, mid-day reset and evening cool down-- in or around middle-C. Warm up the voice before singing.     1. Sustains: take a deep breath, posture the vowel (lax jaw, round lips) and sustain a comfortable pitch for as long as you can. Can you feel the sound buzzing at the lips?   2. Glides: take a deep breath, posture the vowel and glide from low to high and high to low. Keep the sound focused forward the whole time to eliminate pitch-breaks.   3. Use the /u/ sound at the onset of some short words and phrases like, "OOOone, two, three;" "OOOwhere;" "OOOwhy," "OOOwell." Do you hear " improvements in the vocal quality at a certain pitch? Does the voice sound louder? Does it feel different?      Environmental and Behavioral Modification  1. Avoid yelling: find ways to get student's attention w/ out voice use such as activity centers, hand claps or harmonica.  2. Use a personal amplification device ([employer]).  3. Conserve voice: plan vocal naps for intentional vocal recovery.  4. Warm up, reset and cool down your voice every day.

## 2022-08-25 NOTE — PROGRESS NOTES
".  Referring provider: Dr. Travis Morales  Reason for visit:  Voice Treatment (21271)   Session # 2    Subjective / History    I had the pleasure of seeing Mr. Whitfield for his second treatment session following full voice evaluation on 5/26/2022. He reports vocal progress and states: "I have mostly good days. Some people comment on my voice and say it sounds better." He endorses use of voice therapy HEP with SOVT straw phonation and vocal hygiene protocols. Sips of water most helpful for nuisance dryness. Denies swallow difficulty or change to taste. Had a court hearing two weeks ago with 2 hours of voice use after which he felt vocal fatigue. Expresses concern about upcomming return to full voice use at trials and depositions. Pt is [place] . Has upcomming survelliance visits with Dr. Morales and repeat PET scheduled for history of T1 N0M0 SCC of LTVC treated with primary RT.    Laryngoscopy findings per Dr. Morales (9/23/21)  Impression & Recommendations: Patient with history of T1 a N0 M0 squamous cell carcinoma of the left true vocal fold status post completion of radiation therapy 6 weeks ago with no evidence of disease doing very well. Went through his video examination from today demonstrating the healing larynx and no evidence of disease in comparison to his previous exam. He is very pleased with his voice improvement. We discussed ongoing voice use and avoiding voice overuse.   He has a scheduled follow-up PET/CT with radiation oncology. He will follow-up with us in 2 months time for reevaluation or sooner if he is having any difficulty    Past Medical History:   Diagnosis Date    GERD (gastroesophageal reflux disease)      Current Outpatient Medications on File Prior to Visit   Medication Sig Dispense Refill    amLODIPine (NORVASC) 2.5 MG tablet Take 2.5 mg by mouth.      pantoprazole (PROTONIX) 40 MG tablet        No current facility-administered medications on file prior to visit. "     Objective    Perceptual assessment:    -CAPE-V Overall Score: 38  -Quality: raspy  -Volume: decreased projection  -Pitch: high F0  -Flexibility: diminished  -Habitual respiratory pattern: breath holding.    Voice Handicap Index-10 (completed to assess self-perceived handicap associated with dysphonia; >11 considered abnormal):   Many people have used the following statements to describe their voices and the effects of their voices on their lives. Please Di[place]ede the response that indicates how frequently you have the same experience.   0 = Never 1 = Almost Never 2 = Sometimes 3 = Almost Always 4 = Always   My voice makes it difficult for people to hear me. 0 1 2 3 4   People have difficulty understanding me in a noisy room. 0 1 2 3 4   My voice difficulties restrict personal and social life. 0 1 2 3 4   I feel left out of conversations because of my voice. 0 1 2 3 4   My voice problem causes me to lose income. 0 1 2 3 4   I feel as though I have to strain to produce voice. 0 1 2 3 4   The clarity of my voice is unpredictable. 0 1 2 3 4   My voice problem upsets me. 0 1 2 3 4   My voice makes me feel handicapped. 0 1 2 3 4   People ask, Whats wrong with your voice? 0 1 2 3 4  Score: 6 (8/25/2022)    Voice Handicap Index-10   Score: 23 (5/26/2022)    The Glottal Closure Index (GCI)   Certain voice symptoms suggest a possible diagnosis of partial paralysis of the vocal folds (paresis) or of possible vocal cord pathology, e.g. nodules, polyps. Aspiration due to glottal insufficiency is another common cause of chronic cough, which this quiz helps identify.  How do the following affect you?  Speaking takes extra effort 0 1 2 3 4 5   Throat discomfort or pain after you use your voice 0 1 2 3 4 5   Vocal fatigue, voice weakness as you talk 0 1 2 3 4 5   Voice cracks, or sounds different 0 1 2 3 4 5   GCI= 2  The Glottal Closure Index (GCI)   GCI=17 (5/26/2022)    Clinical Evaluation: This is a well developed, well  nourished male who presents in no acute distress. The patient is fully oriented, affect is WNL. Oral mechanism examination reveals articulators to have range, speed and coordination of movement WFL for speech and swallow tasks. Voice today is mild to moderately dysphonic at high F0 with gravely, possibly ventricular quality at modal to lower F0.    Education / Treatment:  Reviewed the workings of larynx for voice production as they pertain to the patient's diagnosis and plan of care for management of dysphonia. QOL screeners re-administered, demonstrating significant progress from voice evaluation baseline in terms of vocal symptoms and self-perception of voice. Reviewed vocal hygiene recommendations and discussed management of dryness from radiation, including systemic hydration, direct steam inhalation and a.m. or p.m. lozenges.     Patient was retrained in SOVT exercises with straw phonation which he demonstrated proficiently. Patient was stimulable for increased volume and access to more forward resonance via safe-projection exercises with overshooting targets and twang, which he found most helpful.  Written instructions for Home Exercise program provided. Encouraged practicing exercises several times daily in isolation and into short phrases to solidify muscle memory patterns and reduce extralaryngeal tension during speech tasks. He was amenable to all suggestions.     Short Term Goals: (4 weeks)   1. Patient will complete SOVT exercises and/or resonant-focused exercises 3-5x daily to improve vocal cord function including pliability and reduction in MTD.     Met   2. Patient will improve the quality, efficiency, and ease of phonation through resonant and/or airflow exercises from the syllable to conversation level with 90% accuracy.     Ongoing    3.  Patient will discriminate between easy and strained phonation with 90% accuracy.     Met   4. Pt will recall and utilize vocal hygiene strategies ind'ly.    Met    5. Pt will reduce/eliminate/substitute throat clearing ind'ly.     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  Prognosis for continued improvement is good, as he was able to demonstrate improved voice in today's session.     Recommendations / POC    -Recommend 1-2 sessions of voice therapy over 12 weeks with a speech-language pathologist to optimize glottal postures for improved vocal function, vocal efficiency, and ease of phonation. Pt agrees to f/u with voice therapy and report progress after f/u visit in Carmen's office and return to full vocal load.  -Continue laryngeal hygiene and voice exercises as trained and discussed in session  -Contact clinician with any further questions